# Patient Record
Sex: MALE | Race: WHITE | NOT HISPANIC OR LATINO | Employment: FULL TIME | URBAN - METROPOLITAN AREA
[De-identification: names, ages, dates, MRNs, and addresses within clinical notes are randomized per-mention and may not be internally consistent; named-entity substitution may affect disease eponyms.]

---

## 2021-01-16 PROCEDURE — U0003 INFECTIOUS AGENT DETECTION BY NUCLEIC ACID (DNA OR RNA); SEVERE ACUTE RESPIRATORY SYNDROME CORONAVIRUS 2 (SARS-COV-2) (CORONAVIRUS DISEASE [COVID-19]), AMPLIFIED PROBE TECHNIQUE, MAKING USE OF HIGH THROUGHPUT TECHNOLOGIES AS DESCRIBED BY CMS-2020-01-R: HCPCS | Performed by: FAMILY MEDICINE

## 2022-01-01 PROCEDURE — U0004 COV-19 TEST NON-CDC HGH THRU: HCPCS | Performed by: NURSE PRACTITIONER

## 2022-01-02 ENCOUNTER — TELEPHONE (OUTPATIENT)
Dept: URGENT CARE | Facility: CLINIC | Age: 82
End: 2022-01-02

## 2022-01-02 NOTE — TELEPHONE ENCOUNTER
Spoke with the pt who stated he is interested in receiving monoclonal antibody therapy. Pt verbalized understanding and provide verbal consent for the therapy. Pt verbalized understanding he is to await contact from [facility].

## 2022-11-07 ENCOUNTER — OFFICE (AMBULATORY)
Dept: URBAN - METROPOLITAN AREA CLINIC 76 | Facility: CLINIC | Age: 82
End: 2022-11-07

## 2022-11-07 VITALS
HEIGHT: 67 IN | DIASTOLIC BLOOD PRESSURE: 62 MMHG | HEART RATE: 77 BPM | WEIGHT: 165 LBS | OXYGEN SATURATION: 94 % | SYSTOLIC BLOOD PRESSURE: 144 MMHG

## 2022-11-07 DIAGNOSIS — R14.2 ERUCTATION: ICD-10-CM

## 2022-11-07 DIAGNOSIS — R10.13 EPIGASTRIC PAIN: ICD-10-CM

## 2022-11-07 DIAGNOSIS — R14.3 FLATULENCE: ICD-10-CM

## 2022-11-07 PROCEDURE — 99204 OFFICE O/P NEW MOD 45 MIN: CPT | Performed by: INTERNAL MEDICINE

## 2022-11-07 NOTE — SERVICENOTES
records reviewed.  CBC within normal limits.  Hepatic function panel within normal limits.  Cholesterol and LDL elevated.  TSH 1.41.

## 2022-11-07 NOTE — SERVICEHPINOTES
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  I have not seen him in several years.  I did a colonoscopy on him years ago.  He is here now because for the past 7 or 8 months she's had worsening reflux.  He also reports abdominal pain and points to the epigastric area.  Symptoms are worse with spicy foods.  He also notes increased bloating, belching and flatus.  There is no nausea or vomiting.  There is no weight loss.  There is no fevers or chills.  There is no dysphagia or odynophagia.  He doesn't smoke or drink.  He does not use nonsteroidals.  Lab work is unremarkable.  He is in no acute distress.  There is no change in his medications.
br
br As above he is up-to-date in terms of colonoscopy.  His bowels are regular.  There is no diarrhea, constipation or bleeding.  Family history is negative for polyps, colitis or colon cancer.  He is in no acute distress.

## 2022-11-08 VITALS
DIASTOLIC BLOOD PRESSURE: 76 MMHG | HEART RATE: 68 BPM | DIASTOLIC BLOOD PRESSURE: 110 MMHG | DIASTOLIC BLOOD PRESSURE: 77 MMHG | HEART RATE: 63 BPM | OXYGEN SATURATION: 79 % | DIASTOLIC BLOOD PRESSURE: 84 MMHG | SYSTOLIC BLOOD PRESSURE: 171 MMHG | TEMPERATURE: 97.2 F | HEART RATE: 103 BPM | WEIGHT: 164 LBS | SYSTOLIC BLOOD PRESSURE: 128 MMHG | OXYGEN SATURATION: 92 % | SYSTOLIC BLOOD PRESSURE: 109 MMHG | OXYGEN SATURATION: 94 % | HEART RATE: 79 BPM | SYSTOLIC BLOOD PRESSURE: 125 MMHG | OXYGEN SATURATION: 96 % | HEART RATE: 80 BPM | RESPIRATION RATE: 9 BRPM | OXYGEN SATURATION: 97 % | RESPIRATION RATE: 10 BRPM | HEART RATE: 62 BPM | DIASTOLIC BLOOD PRESSURE: 75 MMHG | OXYGEN SATURATION: 100 % | RESPIRATION RATE: 16 BRPM | SYSTOLIC BLOOD PRESSURE: 196 MMHG | DIASTOLIC BLOOD PRESSURE: 82 MMHG | HEIGHT: 67 IN | RESPIRATION RATE: 18 BRPM | DIASTOLIC BLOOD PRESSURE: 73 MMHG | TEMPERATURE: 97.3 F | HEART RATE: 73 BPM | SYSTOLIC BLOOD PRESSURE: 135 MMHG | SYSTOLIC BLOOD PRESSURE: 117 MMHG

## 2022-11-09 ENCOUNTER — OFFICE (AMBULATORY)
Dept: URBAN - METROPOLITAN AREA PATHOLOGY 4 | Facility: PATHOLOGY | Age: 82
End: 2022-11-09

## 2022-11-09 ENCOUNTER — AMBULATORY SURGICAL CENTER (AMBULATORY)
Dept: URBAN - METROPOLITAN AREA SURGERY 17 | Facility: SURGERY | Age: 82
End: 2022-11-09
Payer: COMMERCIAL

## 2022-11-09 DIAGNOSIS — K44.9 DIAPHRAGMATIC HERNIA WITHOUT OBSTRUCTION OR GANGRENE: ICD-10-CM

## 2022-11-09 DIAGNOSIS — K52.9 NONINFECTIVE GASTROENTERITIS AND COLITIS, UNSPECIFIED: ICD-10-CM

## 2022-11-09 DIAGNOSIS — R10.13 EPIGASTRIC PAIN: ICD-10-CM

## 2022-11-09 DIAGNOSIS — K20.80 OTHER ESOPHAGITIS WITHOUT BLEEDING: ICD-10-CM

## 2022-11-09 DIAGNOSIS — R14.2 ERUCTATION: ICD-10-CM

## 2022-11-09 DIAGNOSIS — K92.89 OTHER SPECIFIED DISEASES OF THE DIGESTIVE SYSTEM: ICD-10-CM

## 2022-11-09 DIAGNOSIS — K31.89 OTHER DISEASES OF STOMACH AND DUODENUM: ICD-10-CM

## 2022-11-09 DIAGNOSIS — K29.70 GASTRITIS, UNSPECIFIED, WITHOUT BLEEDING: ICD-10-CM

## 2022-11-09 DIAGNOSIS — R14.3 FLATULENCE: ICD-10-CM

## 2022-11-09 DIAGNOSIS — K21.9 GASTRO-ESOPHAGEAL REFLUX DISEASE WITHOUT ESOPHAGITIS: ICD-10-CM

## 2022-11-09 DIAGNOSIS — K31.7 POLYP OF STOMACH AND DUODENUM: ICD-10-CM

## 2022-11-09 PROBLEM — K20.90 ESOPHAGITIS, UNSPECIFIED WITHOUT BLEEDING: Status: ACTIVE | Noted: 2022-11-09

## 2022-11-09 LAB
GI HISTOLOGY: A. UNSPECIFIED: (no result)
GI HISTOLOGY: B. SELECT: (no result)
GI HISTOLOGY: C. UNSPECIFIED: (no result)
GI HISTOLOGY: PDF REPORT: (no result)

## 2022-11-09 PROCEDURE — 43239 EGD BIOPSY SINGLE/MULTIPLE: CPT | Performed by: INTERNAL MEDICINE

## 2022-11-09 PROCEDURE — 88305 TISSUE EXAM BY PATHOLOGIST: CPT | Performed by: INTERNAL MEDICINE

## 2022-11-09 NOTE — SERVICEHPINOTES
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  I have not seen him in several years.  I did a colonoscopy on him years ago.  He is here now because for the past 7 or 8 months she's had worsening reflux.  He also reports abdominal pain and points to the epigastric area.  Symptoms are worse with spicy foods.  He also notes increased bloating, belching and flatus.  There is no nausea or vomiting.  There is no weight loss.  There is no fevers or chills.  There is no dysphagia or odynophagia.  He doesn't smoke or drink.  He does not use nonsteroidals.  Lab work is unremarkable.  He is in no acute distress.  There is no change in his medications.As above he is up-to-date in terms of colonoscopy.  His bowels are regular.  There is no diarrhea, constipation or bleeding.  Family history is negative for polyps, colitis or colon cancer.  He is in no acute distress.

## 2023-02-06 ENCOUNTER — OFFICE (AMBULATORY)
Dept: URBAN - METROPOLITAN AREA CLINIC 76 | Facility: CLINIC | Age: 83
End: 2023-02-06
Payer: COMMERCIAL

## 2023-02-06 VITALS
WEIGHT: 166 LBS | OXYGEN SATURATION: 97 % | SYSTOLIC BLOOD PRESSURE: 130 MMHG | HEIGHT: 67 IN | HEART RATE: 70 BPM | DIASTOLIC BLOOD PRESSURE: 76 MMHG

## 2023-02-06 DIAGNOSIS — K20.80 OTHER ESOPHAGITIS WITHOUT BLEEDING: ICD-10-CM

## 2023-02-06 DIAGNOSIS — K44.9 DIAPHRAGMATIC HERNIA WITHOUT OBSTRUCTION OR GANGRENE: ICD-10-CM

## 2023-02-06 DIAGNOSIS — R14.3 FLATULENCE: ICD-10-CM

## 2023-02-06 DIAGNOSIS — K21.9 GASTRO-ESOPHAGEAL REFLUX DISEASE WITHOUT ESOPHAGITIS: ICD-10-CM

## 2023-02-06 PROCEDURE — 99213 OFFICE O/P EST LOW 20 MIN: CPT

## 2024-11-13 ENCOUNTER — OFFICE (AMBULATORY)
Age: 84
End: 2024-11-13

## 2024-11-13 ENCOUNTER — OFFICE (AMBULATORY)
Dept: URBAN - METROPOLITAN AREA CLINIC 76 | Facility: CLINIC | Age: 84
End: 2024-11-13

## 2024-11-13 VITALS
OXYGEN SATURATION: 97 % | SYSTOLIC BLOOD PRESSURE: 120 MMHG | HEART RATE: 67 BPM | DIASTOLIC BLOOD PRESSURE: 64 MMHG | OXYGEN SATURATION: 97 % | SYSTOLIC BLOOD PRESSURE: 120 MMHG | WEIGHT: 163 LBS | OXYGEN SATURATION: 97 % | DIASTOLIC BLOOD PRESSURE: 64 MMHG | OXYGEN SATURATION: 97 % | SYSTOLIC BLOOD PRESSURE: 120 MMHG | WEIGHT: 163 LBS | HEIGHT: 67 IN | SYSTOLIC BLOOD PRESSURE: 120 MMHG | HEIGHT: 67 IN | OXYGEN SATURATION: 97 % | DIASTOLIC BLOOD PRESSURE: 64 MMHG | HEIGHT: 67 IN | DIASTOLIC BLOOD PRESSURE: 64 MMHG | DIASTOLIC BLOOD PRESSURE: 64 MMHG | HEART RATE: 67 BPM | WEIGHT: 163 LBS | SYSTOLIC BLOOD PRESSURE: 120 MMHG | HEART RATE: 67 BPM | SYSTOLIC BLOOD PRESSURE: 120 MMHG | HEART RATE: 67 BPM | HEART RATE: 67 BPM | OXYGEN SATURATION: 97 % | DIASTOLIC BLOOD PRESSURE: 64 MMHG | HEIGHT: 67 IN | WEIGHT: 163 LBS | WEIGHT: 163 LBS | WEIGHT: 163 LBS | DIASTOLIC BLOOD PRESSURE: 64 MMHG | HEIGHT: 67 IN | SYSTOLIC BLOOD PRESSURE: 120 MMHG | OXYGEN SATURATION: 97 % | HEART RATE: 67 BPM | WEIGHT: 163 LBS | HEIGHT: 67 IN | HEIGHT: 67 IN | HEART RATE: 67 BPM

## 2024-11-13 DIAGNOSIS — K31.89 OTHER DISEASES OF STOMACH AND DUODENUM: ICD-10-CM

## 2024-11-13 DIAGNOSIS — K21.00 GASTRO-ESOPHAGEAL REFLUX DISEASE WITH ESOPHAGITIS, WITHOUT B: ICD-10-CM

## 2024-11-13 DIAGNOSIS — K44.9 DIAPHRAGMATIC HERNIA WITHOUT OBSTRUCTION OR GANGRENE: ICD-10-CM

## 2024-11-13 DIAGNOSIS — K92.89 OTHER SPECIFIED DISEASES OF THE DIGESTIVE SYSTEM: ICD-10-CM

## 2024-11-13 DIAGNOSIS — R14.3 FLATULENCE: ICD-10-CM

## 2024-11-13 PROBLEM — K20.90 ESOPHAGITIS, UNSPECIFIED WITHOUT BLEEDING: Status: ACTIVE | Noted: 2022-11-09

## 2024-11-13 PROBLEM — K20.80 OTHER ESOPHAGITIS WITHOUT BLEEDING: Status: ACTIVE | Noted: 2022-11-09

## 2024-11-13 PROCEDURE — 99214 OFFICE O/P EST MOD 30 MIN: CPT | Performed by: INTERNAL MEDICINE

## 2024-11-13 NOTE — SERVICEHPINOTES
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  He continues to have issues with gas bloating as well as occasional pain in the throat.  He has epigastric pain at times.  He also has increased flatus and his wife says he will hiccup at night and has "uncontrolled flatus".  He reports abdominal pain with certain foods and specifically hot peppers.  There is no nausea or vomiting.  There is no weight loss.  He is currently not on anything for reflux.  He stopped the medication because he says it didn't seem to help.  I biopsied him years ago.  He had duodenitis on a small bowel biopsy and gastric biopsy was negative for H. pylori.  He doesn't smoke or drink.  He doesn't take nonsteroidals.  There is no nausea, vomiting, melena or hematemesis.
br
br   He has no lower GI complaints.  There is no diarrhea or constipation there is no blood in the bowels.  There is no change in his medicines.  Again the only GI issue from a lower GI standpoint is increased flatus.  He is in no acute distress.

## 2024-11-13 NOTE — SERVICEHPINOTES
Mister Ring is here for follow-up, I have not seen him in some time.  He continues to have issues with gas bloating as well as occasional pain in the throat.  He has epigastric pain at times.  He also has increased flatus and his wife says he will hiccup at night and has "uncontrolled flatus".  He reports abdominal pain with certain foods and specifically hot peppers.  There is no nausea or vomiting.  There is no weight loss.  He is currently not on anything for reflux.  He stopped the medication because he says it didn't seem to help.  I biopsied him years ago.  He had duodenitis on a small bowel biopsy and gastric biopsy was negative for H. pylori.  He doesn't smoke or drink.  He doesn't take nonsteroidals.  There is no nausea, vomiting, melena or hematemesis.
br
br   He has no lower GI complaints.  There is no diarrhea or constipation there is no blood in the bowels.  There is no change in his medicines.  Again the only GI issue from a lower GI standpoint is increased flatus.  He is in no acute distress.

## 2024-11-13 NOTE — SERVICEHPINOTES
Mister Rodriguez is here for follow-up, I have not seen him in some time.  He continues to have issues with gas bloating as well as occasional pain in the throat.  He has epigastric pain at times.  He also has increased flatus and his wife says he will hiccup at night and has "uncontrolled flatus".  He reports abdominal pain with certain foods and specifically hot peppers.  There is no nausea or vomiting.  There is no weight loss.  He is currently not on anything for reflux.  He stopped the medication because he says it didn't seem to help.  I biopsied him years ago.  He had duodenitis on a small bowel biopsy and gastric biopsy was negative for H. pylori.  He doesn't smoke or drink.  He doesn't take nonsteroidals.  There is no nausea, vomiting, melena or hematemesis.
br
br   He has no lower GI complaints.  There is no diarrhea or constipation there is no blood in the bowels.  There is no change in his medicines.  Again the only GI issue from a lower GI standpoint is increased flatus.  He is in no acute distress.

## 2024-11-13 NOTE — SERVICEHPINOTES
Mister Zamudio is here for follow-up, I have not seen him in some time.  He continues to have issues with gas bloating as well as occasional pain in the throat.  He has epigastric pain at times.  He also has increased flatus and his wife says he will hiccup at night and has "uncontrolled flatus".  He reports abdominal pain with certain foods and specifically hot peppers.  There is no nausea or vomiting.  There is no weight loss.  He is currently not on anything for reflux.  He stopped the medication because he says it didn't seem to help.  I biopsied him years ago.  He had duodenitis on a small bowel biopsy and gastric biopsy was negative for H. pylori.  He doesn't smoke or drink.  He doesn't take nonsteroidals.  There is no nausea, vomiting, melena or hematemesis.
br
br   He has no lower GI complaints.  There is no diarrhea or constipation there is no blood in the bowels.  There is no change in his medicines.  Again the only GI issue from a lower GI standpoint is increased flatus.  He is in no acute distress.

## 2024-11-13 NOTE — SERVICEHPINOTES
Mister Berry is here for follow-up, I have not seen him in some time.  He continues to have issues with gas bloating as well as occasional pain in the throat.  He has epigastric pain at times.  He also has increased flatus and his wife says he will hiccup at night and has "uncontrolled flatus".  He reports abdominal pain with certain foods and specifically hot peppers.  There is no nausea or vomiting.  There is no weight loss.  He is currently not on anything for reflux.  He stopped the medication because he says it didn't seem to help.  I biopsied him years ago.  He had duodenitis on a small bowel biopsy and gastric biopsy was negative for H. pylori.  He doesn't smoke or drink.  He doesn't take nonsteroidals.  There is no nausea, vomiting, melena or hematemesis.
br
br   He has no lower GI complaints.  There is no diarrhea or constipation there is no blood in the bowels.  There is no change in his medicines.  Again the only GI issue from a lower GI standpoint is increased flatus.  He is in no acute distress.

## 2024-11-13 NOTE — SERVICEHPINOTES
Mister Gomez is here for follow-up, I have not seen him in some time.  He continues to have issues with gas bloating as well as occasional pain in the throat.  He has epigastric pain at times.  He also has increased flatus and his wife says he will hiccup at night and has "uncontrolled flatus".  He reports abdominal pain with certain foods and specifically hot peppers.  There is no nausea or vomiting.  There is no weight loss.  He is currently not on anything for reflux.  He stopped the medication because he says it didn't seem to help.  I biopsied him years ago.  He had duodenitis on a small bowel biopsy and gastric biopsy was negative for H. pylori.  He doesn't smoke or drink.  He doesn't take nonsteroidals.  There is no nausea, vomiting, melena or hematemesis.
br
br   He has no lower GI complaints.  There is no diarrhea or constipation there is no blood in the bowels.  There is no change in his medicines.  Again the only GI issue from a lower GI standpoint is increased flatus.  He is in no acute distress.

## 2024-11-13 NOTE — SERVICEHPINOTES
Mister Varela is here for follow-up, I have not seen him in some time.  He continues to have issues with gas bloating as well as occasional pain in the throat.  He has epigastric pain at times.  He also has increased flatus and his wife says he will hiccup at night and has "uncontrolled flatus".  He reports abdominal pain with certain foods and specifically hot peppers.  There is no nausea or vomiting.  There is no weight loss.  He is currently not on anything for reflux.  He stopped the medication because he says it didn't seem to help.  I biopsied him years ago.  He had duodenitis on a small bowel biopsy and gastric biopsy was negative for H. pylori.  He doesn't smoke or drink.  He doesn't take nonsteroidals.  There is no nausea, vomiting, melena or hematemesis.
br
br   He has no lower GI complaints.  There is no diarrhea or constipation there is no blood in the bowels.  There is no change in his medicines.  Again the only GI issue from a lower GI standpoint is increased flatus.  He is in no acute distress.

## 2024-11-25 VITALS
RESPIRATION RATE: 9 BRPM | RESPIRATION RATE: 12 BRPM | DIASTOLIC BLOOD PRESSURE: 86 MMHG | RESPIRATION RATE: 20 BRPM | RESPIRATION RATE: 14 BRPM | HEART RATE: 67 BPM | SYSTOLIC BLOOD PRESSURE: 180 MMHG | DIASTOLIC BLOOD PRESSURE: 86 MMHG | DIASTOLIC BLOOD PRESSURE: 75 MMHG | RESPIRATION RATE: 11 BRPM | SYSTOLIC BLOOD PRESSURE: 180 MMHG | DIASTOLIC BLOOD PRESSURE: 63 MMHG | RESPIRATION RATE: 10 BRPM | OXYGEN SATURATION: 96 % | DIASTOLIC BLOOD PRESSURE: 91 MMHG | DIASTOLIC BLOOD PRESSURE: 75 MMHG | SYSTOLIC BLOOD PRESSURE: 143 MMHG | DIASTOLIC BLOOD PRESSURE: 94 MMHG | RESPIRATION RATE: 20 BRPM | DIASTOLIC BLOOD PRESSURE: 94 MMHG | DIASTOLIC BLOOD PRESSURE: 76 MMHG | SYSTOLIC BLOOD PRESSURE: 183 MMHG | DIASTOLIC BLOOD PRESSURE: 86 MMHG | DIASTOLIC BLOOD PRESSURE: 76 MMHG | SYSTOLIC BLOOD PRESSURE: 143 MMHG | HEART RATE: 66 BPM | SYSTOLIC BLOOD PRESSURE: 143 MMHG | DIASTOLIC BLOOD PRESSURE: 75 MMHG | RESPIRATION RATE: 14 BRPM | TEMPERATURE: 97.4 F | OXYGEN SATURATION: 99 % | HEART RATE: 72 BPM | RESPIRATION RATE: 14 BRPM | DIASTOLIC BLOOD PRESSURE: 86 MMHG | DIASTOLIC BLOOD PRESSURE: 94 MMHG | DIASTOLIC BLOOD PRESSURE: 81 MMHG | DIASTOLIC BLOOD PRESSURE: 76 MMHG | RESPIRATION RATE: 16 BRPM | DIASTOLIC BLOOD PRESSURE: 63 MMHG | DIASTOLIC BLOOD PRESSURE: 81 MMHG | HEART RATE: 65 BPM | DIASTOLIC BLOOD PRESSURE: 91 MMHG | TEMPERATURE: 97.6 F | RESPIRATION RATE: 16 BRPM | HEART RATE: 72 BPM | SYSTOLIC BLOOD PRESSURE: 142 MMHG | SYSTOLIC BLOOD PRESSURE: 141 MMHG | HEIGHT: 66 IN | RESPIRATION RATE: 16 BRPM | SYSTOLIC BLOOD PRESSURE: 156 MMHG | SYSTOLIC BLOOD PRESSURE: 141 MMHG | DIASTOLIC BLOOD PRESSURE: 94 MMHG | RESPIRATION RATE: 12 BRPM | SYSTOLIC BLOOD PRESSURE: 156 MMHG | DIASTOLIC BLOOD PRESSURE: 81 MMHG | SYSTOLIC BLOOD PRESSURE: 141 MMHG | TEMPERATURE: 97.4 F | TEMPERATURE: 97.4 F | SYSTOLIC BLOOD PRESSURE: 142 MMHG | RESPIRATION RATE: 9 BRPM | RESPIRATION RATE: 11 BRPM | HEART RATE: 67 BPM | OXYGEN SATURATION: 100 % | WEIGHT: 162 LBS | RESPIRATION RATE: 11 BRPM | TEMPERATURE: 97.6 F | SYSTOLIC BLOOD PRESSURE: 157 MMHG | RESPIRATION RATE: 9 BRPM | DIASTOLIC BLOOD PRESSURE: 63 MMHG | OXYGEN SATURATION: 99 % | HEART RATE: 67 BPM | DIASTOLIC BLOOD PRESSURE: 71 MMHG | OXYGEN SATURATION: 99 % | SYSTOLIC BLOOD PRESSURE: 183 MMHG | SYSTOLIC BLOOD PRESSURE: 157 MMHG | RESPIRATION RATE: 11 BRPM | HEART RATE: 66 BPM | HEART RATE: 65 BPM | SYSTOLIC BLOOD PRESSURE: 141 MMHG | TEMPERATURE: 97.6 F | HEART RATE: 66 BPM | DIASTOLIC BLOOD PRESSURE: 76 MMHG | HEART RATE: 68 BPM | WEIGHT: 162 LBS | SYSTOLIC BLOOD PRESSURE: 183 MMHG | SYSTOLIC BLOOD PRESSURE: 180 MMHG | SYSTOLIC BLOOD PRESSURE: 183 MMHG | HEART RATE: 61 BPM | DIASTOLIC BLOOD PRESSURE: 81 MMHG | SYSTOLIC BLOOD PRESSURE: 142 MMHG | DIASTOLIC BLOOD PRESSURE: 81 MMHG | RESPIRATION RATE: 11 BRPM | HEART RATE: 67 BPM | SYSTOLIC BLOOD PRESSURE: 157 MMHG | RESPIRATION RATE: 10 BRPM | HEIGHT: 66 IN | WEIGHT: 162 LBS | HEART RATE: 61 BPM | RESPIRATION RATE: 11 BRPM | RESPIRATION RATE: 10 BRPM | HEART RATE: 61 BPM | SYSTOLIC BLOOD PRESSURE: 169 MMHG | SYSTOLIC BLOOD PRESSURE: 156 MMHG | DIASTOLIC BLOOD PRESSURE: 76 MMHG | DIASTOLIC BLOOD PRESSURE: 71 MMHG | HEART RATE: 61 BPM | DIASTOLIC BLOOD PRESSURE: 81 MMHG | WEIGHT: 162 LBS | SYSTOLIC BLOOD PRESSURE: 169 MMHG | SYSTOLIC BLOOD PRESSURE: 169 MMHG | SYSTOLIC BLOOD PRESSURE: 157 MMHG | DIASTOLIC BLOOD PRESSURE: 71 MMHG | SYSTOLIC BLOOD PRESSURE: 157 MMHG | SYSTOLIC BLOOD PRESSURE: 143 MMHG | DIASTOLIC BLOOD PRESSURE: 94 MMHG | SYSTOLIC BLOOD PRESSURE: 156 MMHG | RESPIRATION RATE: 10 BRPM | DIASTOLIC BLOOD PRESSURE: 94 MMHG | HEART RATE: 61 BPM | TEMPERATURE: 97.6 F | RESPIRATION RATE: 9 BRPM | DIASTOLIC BLOOD PRESSURE: 76 MMHG | RESPIRATION RATE: 12 BRPM | TEMPERATURE: 97.4 F | HEART RATE: 65 BPM | RESPIRATION RATE: 16 BRPM | SYSTOLIC BLOOD PRESSURE: 169 MMHG | DIASTOLIC BLOOD PRESSURE: 91 MMHG | DIASTOLIC BLOOD PRESSURE: 91 MMHG | HEART RATE: 68 BPM | RESPIRATION RATE: 12 BRPM | OXYGEN SATURATION: 96 % | TEMPERATURE: 97.4 F | SYSTOLIC BLOOD PRESSURE: 157 MMHG | RESPIRATION RATE: 10 BRPM | RESPIRATION RATE: 12 BRPM | HEART RATE: 72 BPM | DIASTOLIC BLOOD PRESSURE: 81 MMHG | HEART RATE: 68 BPM | HEART RATE: 66 BPM | DIASTOLIC BLOOD PRESSURE: 71 MMHG | SYSTOLIC BLOOD PRESSURE: 180 MMHG | WEIGHT: 162 LBS | RESPIRATION RATE: 20 BRPM | OXYGEN SATURATION: 99 % | RESPIRATION RATE: 9 BRPM | OXYGEN SATURATION: 96 % | HEART RATE: 72 BPM | SYSTOLIC BLOOD PRESSURE: 169 MMHG | RESPIRATION RATE: 20 BRPM | DIASTOLIC BLOOD PRESSURE: 63 MMHG | OXYGEN SATURATION: 99 % | HEART RATE: 61 BPM | DIASTOLIC BLOOD PRESSURE: 76 MMHG | OXYGEN SATURATION: 96 % | RESPIRATION RATE: 16 BRPM | TEMPERATURE: 97.4 F | SYSTOLIC BLOOD PRESSURE: 143 MMHG | DIASTOLIC BLOOD PRESSURE: 86 MMHG | WEIGHT: 162 LBS | HEART RATE: 72 BPM | DIASTOLIC BLOOD PRESSURE: 71 MMHG | RESPIRATION RATE: 16 BRPM | SYSTOLIC BLOOD PRESSURE: 169 MMHG | RESPIRATION RATE: 14 BRPM | HEART RATE: 68 BPM | DIASTOLIC BLOOD PRESSURE: 75 MMHG | DIASTOLIC BLOOD PRESSURE: 71 MMHG | SYSTOLIC BLOOD PRESSURE: 141 MMHG | OXYGEN SATURATION: 100 % | OXYGEN SATURATION: 99 % | TEMPERATURE: 97.6 F | DIASTOLIC BLOOD PRESSURE: 86 MMHG | HEIGHT: 66 IN | WEIGHT: 162 LBS | HEART RATE: 68 BPM | DIASTOLIC BLOOD PRESSURE: 71 MMHG | RESPIRATION RATE: 14 BRPM | HEART RATE: 68 BPM | SYSTOLIC BLOOD PRESSURE: 143 MMHG | SYSTOLIC BLOOD PRESSURE: 142 MMHG | HEART RATE: 65 BPM | SYSTOLIC BLOOD PRESSURE: 180 MMHG | RESPIRATION RATE: 9 BRPM | DIASTOLIC BLOOD PRESSURE: 63 MMHG | SYSTOLIC BLOOD PRESSURE: 142 MMHG | HEART RATE: 61 BPM | OXYGEN SATURATION: 100 % | RESPIRATION RATE: 14 BRPM | RESPIRATION RATE: 10 BRPM | OXYGEN SATURATION: 100 % | HEART RATE: 72 BPM | OXYGEN SATURATION: 96 % | OXYGEN SATURATION: 100 % | HEART RATE: 72 BPM | TEMPERATURE: 97.6 F | DIASTOLIC BLOOD PRESSURE: 91 MMHG | SYSTOLIC BLOOD PRESSURE: 183 MMHG | HEART RATE: 66 BPM | HEART RATE: 65 BPM | RESPIRATION RATE: 12 BRPM | OXYGEN SATURATION: 96 % | RESPIRATION RATE: 12 BRPM | HEIGHT: 66 IN | SYSTOLIC BLOOD PRESSURE: 156 MMHG | DIASTOLIC BLOOD PRESSURE: 63 MMHG | HEART RATE: 67 BPM | TEMPERATURE: 97.4 F | DIASTOLIC BLOOD PRESSURE: 75 MMHG | HEIGHT: 66 IN | SYSTOLIC BLOOD PRESSURE: 180 MMHG | RESPIRATION RATE: 10 BRPM | HEIGHT: 66 IN | SYSTOLIC BLOOD PRESSURE: 157 MMHG | SYSTOLIC BLOOD PRESSURE: 156 MMHG | RESPIRATION RATE: 11 BRPM | RESPIRATION RATE: 14 BRPM | HEART RATE: 65 BPM | RESPIRATION RATE: 20 BRPM | SYSTOLIC BLOOD PRESSURE: 142 MMHG | HEIGHT: 66 IN | SYSTOLIC BLOOD PRESSURE: 169 MMHG | HEART RATE: 67 BPM | SYSTOLIC BLOOD PRESSURE: 183 MMHG | SYSTOLIC BLOOD PRESSURE: 143 MMHG | RESPIRATION RATE: 16 BRPM | HEART RATE: 67 BPM | DIASTOLIC BLOOD PRESSURE: 94 MMHG | DIASTOLIC BLOOD PRESSURE: 63 MMHG | OXYGEN SATURATION: 100 % | RESPIRATION RATE: 9 BRPM | DIASTOLIC BLOOD PRESSURE: 86 MMHG | OXYGEN SATURATION: 96 % | SYSTOLIC BLOOD PRESSURE: 156 MMHG | RESPIRATION RATE: 20 BRPM | OXYGEN SATURATION: 100 % | RESPIRATION RATE: 20 BRPM | SYSTOLIC BLOOD PRESSURE: 141 MMHG | DIASTOLIC BLOOD PRESSURE: 75 MMHG | HEART RATE: 68 BPM | SYSTOLIC BLOOD PRESSURE: 142 MMHG | HEART RATE: 65 BPM | SYSTOLIC BLOOD PRESSURE: 141 MMHG | HEART RATE: 66 BPM | SYSTOLIC BLOOD PRESSURE: 183 MMHG | DIASTOLIC BLOOD PRESSURE: 91 MMHG | DIASTOLIC BLOOD PRESSURE: 75 MMHG | DIASTOLIC BLOOD PRESSURE: 91 MMHG | TEMPERATURE: 97.6 F | HEART RATE: 66 BPM | OXYGEN SATURATION: 99 % | SYSTOLIC BLOOD PRESSURE: 180 MMHG

## 2024-12-02 ENCOUNTER — OFFICE (AMBULATORY)
Age: 84
End: 2024-12-02
Payer: COMMERCIAL

## 2024-12-02 ENCOUNTER — AMBULATORY SURGICAL CENTER (AMBULATORY)
Age: 84
End: 2024-12-02
Payer: COMMERCIAL

## 2024-12-02 ENCOUNTER — AMBULATORY SURGICAL CENTER (AMBULATORY)
Dept: URBAN - METROPOLITAN AREA SURGERY 17 | Facility: SURGERY | Age: 84
End: 2024-12-02
Payer: COMMERCIAL

## 2024-12-02 ENCOUNTER — OFFICE (AMBULATORY)
Dept: URBAN - METROPOLITAN AREA PATHOLOGY 4 | Facility: PATHOLOGY | Age: 84
End: 2024-12-02
Payer: COMMERCIAL

## 2024-12-02 DIAGNOSIS — K22.2 ESOPHAGEAL OBSTRUCTION: ICD-10-CM

## 2024-12-02 DIAGNOSIS — K44.9 DIAPHRAGMATIC HERNIA WITHOUT OBSTRUCTION OR GANGRENE: ICD-10-CM

## 2024-12-02 DIAGNOSIS — K31.89 OTHER DISEASES OF STOMACH AND DUODENUM: ICD-10-CM

## 2024-12-02 DIAGNOSIS — K21.00 GASTRO-ESOPHAGEAL REFLUX DISEASE WITH ESOPHAGITIS, WITHOUT B: ICD-10-CM

## 2024-12-02 DIAGNOSIS — K29.80 DUODENITIS WITHOUT BLEEDING: ICD-10-CM

## 2024-12-02 PROBLEM — K29.70 GASTRITIS, UNSPECIFIED, WITHOUT BLEEDING: Status: ACTIVE | Noted: 2024-12-02

## 2024-12-02 LAB
GI HISTOLOGY: A. SECOND PART OF THE DUODENUM: (no result)
GI HISTOLOGY: B. STOMACH ANTRUM: (no result)
GI HISTOLOGY: C. GASTRO-ESOPHAGEAL JUNCTION: (no result)
GI HISTOLOGY: PDF REPORT: (no result)

## 2024-12-02 PROCEDURE — 43239 EGD BIOPSY SINGLE/MULTIPLE: CPT | Performed by: INTERNAL MEDICINE

## 2024-12-02 PROCEDURE — 88342 IMHCHEM/IMCYTCHM 1ST ANTB: CPT | Performed by: PATHOLOGY

## 2024-12-02 PROCEDURE — 88305 TISSUE EXAM BY PATHOLOGIST: CPT | Performed by: PATHOLOGY

## 2024-12-02 NOTE — SERVICEHPINOTES
Mister Zamudio is here for follow-up, I have not seen him in some time.  He continues to have issues with gas bloating as well as occasional pain in the throat.  He has epigastric pain at times.  He also has increased flatus and his wife says he will hiccup at night and has "uncontrolled flatus".  He reports abdominal pain with certain foods and specifically hot peppers.  There is no nausea or vomiting.  There is no weight loss.  He is currently not on anything for reflux.  He stopped the medication because he says it didn't seem to help.  I biopsied him years ago.  He had duodenitis on a small bowel biopsy and gastric biopsy was negative for H. pylori.  He doesn't smoke or drink.  He doesn't take nonsteroidals.  There is no nausea, vomiting, melena or hematemesis.He has no lower GI complaints.  There is no diarrhea or constipation there is no blood in the bowels.  There is no change in his medicines.  Again the only GI issue from a lower GI standpoint is increased flatus.  He is in no acute distress.

## 2024-12-02 NOTE — SERVICEHPINOTES
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  He continues to have issues with gas bloating as well as occasional pain in the throat.  He has epigastric pain at times.  He also has increased flatus and his wife says he will hiccup at night and has "uncontrolled flatus".  He reports abdominal pain with certain foods and specifically hot peppers.  There is no nausea or vomiting.  There is no weight loss.  He is currently not on anything for reflux.  He stopped the medication because he says it didn't seem to help.  I biopsied him years ago.  He had duodenitis on a small bowel biopsy and gastric biopsy was negative for H. pylori.  He doesn't smoke or drink.  He doesn't take nonsteroidals.  There is no nausea, vomiting, melena or hematemesis.He has no lower GI complaints.  There is no diarrhea or constipation there is no blood in the bowels.  There is no change in his medicines.  Again the only GI issue from a lower GI standpoint is increased flatus.  He is in no acute distress.

## 2024-12-02 NOTE — SERVICEHPINOTES
Mister Berry is here for follow-up, I have not seen him in some time.  He continues to have issues with gas bloating as well as occasional pain in the throat.  He has epigastric pain at times.  He also has increased flatus and his wife says he will hiccup at night and has "uncontrolled flatus".  He reports abdominal pain with certain foods and specifically hot peppers.  There is no nausea or vomiting.  There is no weight loss.  He is currently not on anything for reflux.  He stopped the medication because he says it didn't seem to help.  I biopsied him years ago.  He had duodenitis on a small bowel biopsy and gastric biopsy was negative for H. pylori.  He doesn't smoke or drink.  He doesn't take nonsteroidals.  There is no nausea, vomiting, melena or hematemesis.He has no lower GI complaints.  There is no diarrhea or constipation there is no blood in the bowels.  There is no change in his medicines.  Again the only GI issue from a lower GI standpoint is increased flatus.  He is in no acute distress.

## 2024-12-02 NOTE — SERVICEHPINOTES
Mister Rodriguez is here for follow-up, I have not seen him in some time.  He continues to have issues with gas bloating as well as occasional pain in the throat.  He has epigastric pain at times.  He also has increased flatus and his wife says he will hiccup at night and has "uncontrolled flatus".  He reports abdominal pain with certain foods and specifically hot peppers.  There is no nausea or vomiting.  There is no weight loss.  He is currently not on anything for reflux.  He stopped the medication because he says it didn't seem to help.  I biopsied him years ago.  He had duodenitis on a small bowel biopsy and gastric biopsy was negative for H. pylori.  He doesn't smoke or drink.  He doesn't take nonsteroidals.  There is no nausea, vomiting, melena or hematemesis.He has no lower GI complaints.  There is no diarrhea or constipation there is no blood in the bowels.  There is no change in his medicines.  Again the only GI issue from a lower GI standpoint is increased flatus.  He is in no acute distress.

## 2024-12-02 NOTE — SERVICEHPINOTES
Mister Varela is here for follow-up, I have not seen him in some time.  He continues to have issues with gas bloating as well as occasional pain in the throat.  He has epigastric pain at times.  He also has increased flatus and his wife says he will hiccup at night and has "uncontrolled flatus".  He reports abdominal pain with certain foods and specifically hot peppers.  There is no nausea or vomiting.  There is no weight loss.  He is currently not on anything for reflux.  He stopped the medication because he says it didn't seem to help.  I biopsied him years ago.  He had duodenitis on a small bowel biopsy and gastric biopsy was negative for H. pylori.  He doesn't smoke or drink.  He doesn't take nonsteroidals.  There is no nausea, vomiting, melena or hematemesis.He has no lower GI complaints.  There is no diarrhea or constipation there is no blood in the bowels.  There is no change in his medicines.  Again the only GI issue from a lower GI standpoint is increased flatus.  He is in no acute distress.

## 2024-12-02 NOTE — SERVICEHPINOTES
Mister Ring is here for follow-up, I have not seen him in some time.  He continues to have issues with gas bloating as well as occasional pain in the throat.  He has epigastric pain at times.  He also has increased flatus and his wife says he will hiccup at night and has "uncontrolled flatus".  He reports abdominal pain with certain foods and specifically hot peppers.  There is no nausea or vomiting.  There is no weight loss.  He is currently not on anything for reflux.  He stopped the medication because he says it didn't seem to help.  I biopsied him years ago.  He had duodenitis on a small bowel biopsy and gastric biopsy was negative for H. pylori.  He doesn't smoke or drink.  He doesn't take nonsteroidals.  There is no nausea, vomiting, melena or hematemesis.He has no lower GI complaints.  There is no diarrhea or constipation there is no blood in the bowels.  There is no change in his medicines.  Again the only GI issue from a lower GI standpoint is increased flatus.  He is in no acute distress.

## 2024-12-02 NOTE — SERVICEHPINOTES
Mister Gomez is here for follow-up, I have not seen him in some time.  He continues to have issues with gas bloating as well as occasional pain in the throat.  He has epigastric pain at times.  He also has increased flatus and his wife says he will hiccup at night and has "uncontrolled flatus".  He reports abdominal pain with certain foods and specifically hot peppers.  There is no nausea or vomiting.  There is no weight loss.  He is currently not on anything for reflux.  He stopped the medication because he says it didn't seem to help.  I biopsied him years ago.  He had duodenitis on a small bowel biopsy and gastric biopsy was negative for H. pylori.  He doesn't smoke or drink.  He doesn't take nonsteroidals.  There is no nausea, vomiting, melena or hematemesis.He has no lower GI complaints.  There is no diarrhea or constipation there is no blood in the bowels.  There is no change in his medicines.  Again the only GI issue from a lower GI standpoint is increased flatus.  He is in no acute distress.

## 2025-03-12 ENCOUNTER — OFFICE (AMBULATORY)
Dept: URBAN - METROPOLITAN AREA CLINIC 76 | Facility: CLINIC | Age: 85
End: 2025-03-12
Payer: COMMERCIAL

## 2025-03-12 VITALS
SYSTOLIC BLOOD PRESSURE: 141 MMHG | DIASTOLIC BLOOD PRESSURE: 70 MMHG | HEIGHT: 66 IN | WEIGHT: 169 LBS | HEART RATE: 69 BPM

## 2025-03-12 DIAGNOSIS — K31.89 OTHER DISEASES OF STOMACH AND DUODENUM: ICD-10-CM

## 2025-03-12 DIAGNOSIS — K29.70 GASTRITIS, UNSPECIFIED, WITHOUT BLEEDING: ICD-10-CM

## 2025-03-12 DIAGNOSIS — K22.2 ESOPHAGEAL OBSTRUCTION: ICD-10-CM

## 2025-03-12 DIAGNOSIS — R14.3 FLATULENCE: ICD-10-CM

## 2025-03-12 DIAGNOSIS — K21.00 GASTRO-ESOPHAGEAL REFLUX DISEASE WITH ESOPHAGITIS, WITHOUT B: ICD-10-CM

## 2025-03-12 DIAGNOSIS — K92.89 OTHER SPECIFIED DISEASES OF THE DIGESTIVE SYSTEM: ICD-10-CM

## 2025-03-12 DIAGNOSIS — K44.9 DIAPHRAGMATIC HERNIA WITHOUT OBSTRUCTION OR GANGRENE: ICD-10-CM

## 2025-03-12 PROBLEM — K20.90 ESOPHAGITIS, UNSPECIFIED WITHOUT BLEEDING: Status: ACTIVE | Noted: 2022-11-09

## 2025-03-12 PROBLEM — K20.80 OTHER ESOPHAGITIS WITHOUT BLEEDING: Status: ACTIVE | Noted: 2022-11-09

## 2025-03-12 PROCEDURE — 99214 OFFICE O/P EST MOD 30 MIN: CPT | Performed by: INTERNAL MEDICINE

## 2025-03-12 NOTE — SERVICENOTES
chart reviewed.  Breath test was positive for bacterial overgrowth.  Positive hydrogen, negative methane.

## 2025-03-12 NOTE — SERVICEHPINOTES
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  He is doing better status post treatment with Flagyl.  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  His belching and gas and flatus have improved.  I did tell him that often times patients have to be retreated periodically with antibiotics, the bacterial overgrowth can relapse and if symptoms worsen to let me know.
br
juan alberto He also has reflux.  He's doing well on Voquezna but it's very expensive so I'm going to switch him to pantoprazole.  He says that certain foods bother him and he specifically mentions pizza so he needs to avoid foods that exacerbate his symptoms.  There is no dysphagia, odynophagia nausea or vomiting.  There is no melena or hematemesis.
br
juan alberto He has no lower GI complaints.  His bowels are regular.  There is no change in his past medical or past surgical history.  He is in no acute distress.